# Patient Record
Sex: MALE | Race: AMERICAN INDIAN OR ALASKA NATIVE
[De-identification: names, ages, dates, MRNs, and addresses within clinical notes are randomized per-mention and may not be internally consistent; named-entity substitution may affect disease eponyms.]

---

## 2018-09-13 ENCOUNTER — HOSPITAL ENCOUNTER (EMERGENCY)
Dept: HOSPITAL 5 - ED | Age: 28
LOS: 1 days | Discharge: PSYCHIATRIC OR SUBSTANCE USE FACILITY | End: 2018-09-14
Payer: MEDICAID

## 2018-09-13 DIAGNOSIS — R10.9: Primary | ICD-10-CM

## 2018-09-13 DIAGNOSIS — F20.9: ICD-10-CM

## 2018-09-13 DIAGNOSIS — F31.9: ICD-10-CM

## 2018-09-13 DIAGNOSIS — I10: ICD-10-CM

## 2018-09-13 LAB
ALBUMIN SERPL-MCNC: 4.3 G/DL (ref 3.9–5)
ALT SERPL-CCNC: 36 UNITS/L (ref 7–56)
BACTERIA #/AREA URNS HPF: (no result) /HPF
BASOPHILS # (AUTO): 0 K/MM3 (ref 0–0.1)
BASOPHILS NFR BLD AUTO: 0.4 % (ref 0–1.8)
BENZODIAZEPINES SCREEN,URINE: (no result)
BILIRUB UR QL STRIP: (no result)
BLOOD UR QL VISUAL: (no result)
BUN SERPL-MCNC: 14 MG/DL (ref 9–20)
BUN/CREAT SERPL: 12 %
CALCIUM SERPL-MCNC: 9.6 MG/DL (ref 8.4–10.2)
EOSINOPHIL # BLD AUTO: 0.1 K/MM3 (ref 0–0.4)
EOSINOPHIL NFR BLD AUTO: 0.8 % (ref 0–4.3)
HCT VFR BLD CALC: 48.3 % (ref 35.5–45.6)
HEMOLYSIS INDEX: 7
HGB BLD-MCNC: 16.1 GM/DL (ref 11.8–15.2)
LIPASE SERPL-CCNC: 37 UNITS/L (ref 13–60)
LYMPHOCYTES # BLD AUTO: 3 K/MM3 (ref 1.2–5.4)
LYMPHOCYTES NFR BLD AUTO: 24.9 % (ref 13.4–35)
MCH RBC QN AUTO: 29 PG (ref 28–32)
MCHC RBC AUTO-ENTMCNC: 33 % (ref 32–34)
MCV RBC AUTO: 87 FL (ref 84–94)
METHADONE SCREEN,URINE: (no result)
MONOCYTES # (AUTO): 0.8 K/MM3 (ref 0–0.8)
MONOCYTES % (AUTO): 6.8 % (ref 0–7.3)
MUCOUS THREADS #/AREA URNS HPF: (no result) /HPF
OPIATE SCREEN,URINE: (no result)
PH UR STRIP: 6 [PH] (ref 5–7)
PLATELET # BLD: 262 K/MM3 (ref 140–440)
PROT UR STRIP-MCNC: (no result) MG/DL
RBC # BLD AUTO: 5.57 M/MM3 (ref 3.65–5.03)
RBC #/AREA URNS HPF: 2 /HPF (ref 0–6)
UROBILINOGEN UR-MCNC: 2 MG/DL (ref ?–2)
WBC #/AREA URNS HPF: 1 /HPF (ref 0–6)

## 2018-09-13 PROCEDURE — 99285 EMERGENCY DEPT VISIT HI MDM: CPT

## 2018-09-13 PROCEDURE — 36415 COLL VENOUS BLD VENIPUNCTURE: CPT

## 2018-09-13 PROCEDURE — 93005 ELECTROCARDIOGRAM TRACING: CPT

## 2018-09-13 PROCEDURE — 80307 DRUG TEST PRSMV CHEM ANLYZR: CPT

## 2018-09-13 PROCEDURE — 80320 DRUG SCREEN QUANTALCOHOLS: CPT

## 2018-09-13 PROCEDURE — 81001 URINALYSIS AUTO W/SCOPE: CPT

## 2018-09-13 PROCEDURE — 93010 ELECTROCARDIOGRAM REPORT: CPT

## 2018-09-13 PROCEDURE — G0480 DRUG TEST DEF 1-7 CLASSES: HCPCS

## 2018-09-13 PROCEDURE — 85025 COMPLETE CBC W/AUTO DIFF WBC: CPT

## 2018-09-13 PROCEDURE — C9113 INJ PANTOPRAZOLE SODIUM, VIA: HCPCS

## 2018-09-13 PROCEDURE — 83690 ASSAY OF LIPASE: CPT

## 2018-09-13 PROCEDURE — 80053 COMPREHEN METABOLIC PANEL: CPT

## 2018-09-13 PROCEDURE — 96374 THER/PROPH/DIAG INJ IV PUSH: CPT

## 2018-09-13 PROCEDURE — 74022 RADEX COMPL AQT ABD SERIES: CPT

## 2018-09-13 NOTE — EMERGENCY DEPARTMENT REPORT
ED Abdominal Pain HPI





- General


Chief Complaint: Abdominal Pain


Stated Complaint: CHEST PAIN  STOMACH PAIN RT SIDE PAIN


Time Seen by Provider: 18 06:13


Source: patient


Mode of arrival: Ambulatory


Limitations: No Limitations





- History of Present Illness


Initial Comments: 





Patient is 27 years old male recently moved from [place].  Patient stated that 

he had history of hypertension, gastric ulcer, schizophrenia, bipolar and 

depression.  Patient presented to the ER initially complaining of abdominal 

pain for 2 weeks.  Patient stated that he went to [facility] last week and 

they did blood work and CT abdomen and pelvis and was told that everything is 

fine.  Patient also had a CT abdomen and pelvis done here in this hospital on  with no acute finding.  Patient stated that his pain still there.  

Patient describes his pain as diffuse abdominal cramping associated with nausea 

but no vomiting.  Patient denied any fever.  As I'm explaining to the patient 

his lab results and the need to follow-up with a GI doctor as an outpatient for 

possible endoscopy and colonoscopy, patient stated that he is not safe to go 

home because he is thinking about killing himself or killing someone else.  The 

patient denied any auditory or visual hallucination.  Patient immediately 

brought on 1013 and mental health evaluation is requested.


MD Complaint: abdominal pain


Severity scale (0 -10): 10





- Related Data


 Previous Rx's











 Medication  Instructions  Recorded  Last Taken  Type


 


Acetaminophen [Acetaminophen TAB] 650 mg PO Q4H PRN  tablet 07/15/18 Unknown Rx


 


Ondansetron [Zofran TAB] 4 mg PO Q8HR PRN #30 tablet 07/15/18 Unknown Rx


 


clonazePAM 0.5 mg PO BID #60 tab.rapdis 07/15/18 Unknown Rx











 Allergies











Allergy/AdvReac Type Severity Reaction Status Date / Time


 


No Known Allergies Allergy   Unverified 18 13:31














ED Review of Systems


ROS: 


Stated complaint: CHEST PAIN  STOMACH PAIN RT SIDE PAIN


Other details as noted in HPI





Comment: All other systems reviewed and negative


Constitutional: denies: chills, fever


Respiratory: denies: cough, orthopnea, shortness of breath, SOB with exertion


Cardiovascular: denies: chest pain, palpitations, dyspnea on exertion


Gastrointestinal: nausea.  denies: abdominal pain, vomiting, diarrhea, 

constipation, hematemesis, melena, hematochezia


Neurological: denies: headache, weakness, numbness, paresthesias, confusion, 

abnormal gait





ED Past Medical Hx





- Past Medical History


Previous Medical History?: Yes


Hx Hypertension: Yes


Hx Heart Attack/AMI: No


Hx Congestive Heart Failure: No


Hx Diabetes: No


Hx Deep Vein Thrombosis: No


Hx Asthma: No


Hx COPD: No





- Surgical History


Past Surgical History?: Yes


Hx Coronary Stent: No


Hx Pacemaker: No


Hx Internal Defibrillator: No





- Social History


Smoking Status: Never Smoker


Substance Use Type: Alcohol





- Medications


Home Medications: 


 Home Medications











 Medication  Instructions  Recorded  Confirmed  Last Taken  Type


 


Acetaminophen [Acetaminophen TAB] 650 mg PO Q4H PRN  tablet 07/15/18  Unknown Rx


 


Ondansetron [Zofran TAB] 4 mg PO Q8HR PRN #30 tablet 07/15/18  Unknown Rx


 


clonazePAM 0.5 mg PO BID #60 tab.rapdis 07/15/18  Unknown Rx














ED Physical Exam





- General


Limitations: No Limitations


General appearance: alert, in no apparent distress





- Head


Head exam: Present: atraumatic, normocephalic, normal inspection





- ENT


ENT exam: Present: normal exam, normal orophraynx, mucous membranes moist





- Neck


Neck exam: Present: normal inspection, full ROM.  Absent: tenderness, 

meningismus, lymphadenopathy, thyromegaly





- Respiratory


Respiratory exam: Present: normal lung sounds bilaterally





- Cardiovascular


Cardiovascular Exam: Present: regular rate, normal rhythm, normal heart sounds





- GI/Abdominal


GI/Abdominal exam: Present: soft, normal bowel sounds.  Absent: distended, 

tenderness, guarding, rebound, rigid, organomegaly, mass, bruit, pulsatile mass

, hernia





- Extremities Exam


Extremities exam: Present: normal inspection, full ROM, normal capillary 

refill.  Absent: pedal edema, joint swelling, calf tenderness





- Back Exam


Back exam: Present: normal inspection, full ROM.  Absent: tenderness, CVA 

tenderness (R), CVA tenderness (L), muscle spasm, paraspinal tenderness, 

vertebral tenderness





- Neurological Exam


Neurological exam: Present: alert, oriented X3, CN II-XII intact, normal gait, 

reflexes normal





- Psychiatric


Psychiatric exam: Present: depressed, anxious, homicidal ideation, suicidal 

ideation





- Skin


Skin exam: Present: warm, intact, normal color





ED Course


 Vital Signs











  18





  04:34 09:09 10:39


 


Temperature 99.1 F  97.5 F L


 


Pulse Rate 120 H  67


 


Respiratory 18 18 18





Rate   


 


Blood Pressure 162/94  


 


Blood Pressure 162/94  107/61





[Left]   


 


O2 Sat by Pulse 100 97 97





Oximetry   














ED Medical Decision Making





- Lab Data


Result diagrams: 


 18 04:46





 18 04:46





- EKG Data


-: EKG Interpreted by Me


EKG shows normal: sinus rhythm


Rate: normal





- EKG Data


Interpretation: no acute changes





- Radiology Data


Radiology results: report reviewed





Referring Physician:   SARA MEDLEY


Patient Name:   PAPITO SAMUELS


Patient ID:   [id number]


YOB: 1990


Sex:   Male


Accession:   [id number]


Report Date:   2018


Report Status:   Finalized


Findings


[facility] 


[address] [place] [address] 





XRay Report 


Signed 





Patient: PAPITO SAMUELS JR MR#: [id number] 


: 1990 Acct:[id number] 


Age/Sex: 27 / M ADM Date: 18 


Loc: ED 


Attending Dr: 








Ordering Physician: SARA MEDLEY 


Date of Service: 18 


Procedure(s): XR abd series w cxr 1V 


Accession Number(s): [id number] 





cc: SARA MEDLEY 





Fluoro Time In Minutes: 





FINAL REPORT 





EXAM: XR ABD SERIES W CXR 1V 





HISTORY: abdominal pain 





TECHNIQUE: A PA view of the chest was obtained along with three 


views of the abdomen and pelvis. 





FINDINGS: 


The chest reveals normal heart size and mediastinum. The lungs 


are clear. Pleural fluid is not seen. The bones and soft tissues 


are well maintained. 





The abdominal bowel gas pattern is normal. There is no evidence 


of mass effect or suspicious calcifications. The skeletal 


structures reveal spurring along the right iliac wing. 





IMPRESSION: 


No acute process in the chest. 





No acute process in the abdomen and pelvis. 











Transcribed By: RB 


Dictated By: DADA POWERS MD 


Electronically Authenticated By: DADA POWERS MD 


Signed Date/Time: 18 











DD/DT: 18 


TD/TT: 18


Critical care attestation.: 


If time is entered above; I have spent that time in minutes in the direct care 

of this critically ill patient, excluding procedure time.








ED Disposition


Clinical Impression: 


 Abdominal pain, Suicidal ideation, Homicidal ideation





Disposition: DC/TX-65 PSY HOSP/PSY UNIT


Is pt being admited?: No


Condition: Stable


Referrals: 


PRIMARY CARE,MD [Primary Care Provider] - 3-5 Days

## 2018-09-13 NOTE — XRAY REPORT
FINAL REPORT



EXAM:  XR ABD SERIES W CXR 1V



HISTORY:  abdominal pain 



TECHNIQUE:  A PA view of the chest was obtained along with three

views of the abdomen and pelvis.



FINDINGS:  

The chest reveals normal heart size and mediastinum. The lungs

are clear. Pleural fluid is not seen. The bones and soft tissues

are well maintained.



The abdominal bowel gas pattern is normal. There is no evidence

of mass effect or suspicious calcifications. The skeletal

structures reveal spurring along the right iliac wing.



IMPRESSION:  

No acute process in the chest.



No acute process in the abdomen and pelvis.

## 2018-09-14 VITALS — DIASTOLIC BLOOD PRESSURE: 53 MMHG | SYSTOLIC BLOOD PRESSURE: 100 MMHG

## 2018-10-07 ENCOUNTER — HOSPITAL ENCOUNTER (EMERGENCY)
Dept: HOSPITAL 5 - ED | Age: 28
LOS: 1 days | Discharge: HOME | End: 2018-10-08
Payer: MEDICAID

## 2018-10-07 VITALS — SYSTOLIC BLOOD PRESSURE: 106 MMHG | DIASTOLIC BLOOD PRESSURE: 67 MMHG

## 2018-10-07 DIAGNOSIS — I10: ICD-10-CM

## 2018-10-07 DIAGNOSIS — R10.9: Primary | ICD-10-CM

## 2018-10-07 DIAGNOSIS — M79.671: ICD-10-CM

## 2018-10-07 DIAGNOSIS — K21.9: ICD-10-CM

## 2018-10-07 LAB
BUN SERPL-MCNC: 11 MG/DL (ref 9–20)
BUN/CREAT SERPL: 9 %
CALCIUM SERPL-MCNC: 9.6 MG/DL (ref 8.4–10.2)
HCT VFR BLD CALC: 47.8 % (ref 35.5–45.6)
HEMOLYSIS INDEX: 6
HGB BLD-MCNC: 15.7 GM/DL (ref 11.8–15.2)
MCH RBC QN AUTO: 29 PG (ref 28–32)
MCHC RBC AUTO-ENTMCNC: 33 % (ref 32–34)
MCV RBC AUTO: 88 FL (ref 84–94)
PLATELET # BLD: 255 K/MM3 (ref 140–440)
RBC # BLD AUTO: 5.45 M/MM3 (ref 3.65–5.03)

## 2018-10-07 PROCEDURE — 83690 ASSAY OF LIPASE: CPT

## 2018-10-07 PROCEDURE — 36415 COLL VENOUS BLD VENIPUNCTURE: CPT

## 2018-10-07 PROCEDURE — 80074 ACUTE HEPATITIS PANEL: CPT

## 2018-10-07 PROCEDURE — 74019 RADEX ABDOMEN 2 VIEWS: CPT

## 2018-10-07 PROCEDURE — 80048 BASIC METABOLIC PNL TOTAL CA: CPT

## 2018-10-07 PROCEDURE — 99284 EMERGENCY DEPT VISIT MOD MDM: CPT

## 2018-10-07 PROCEDURE — 85027 COMPLETE CBC AUTOMATED: CPT

## 2018-10-07 NOTE — XRAY REPORT
FINAL REPORT



EXAM:  XR ABDOMEN 2V



HISTORY:  Abd pain 



TECHNIQUE:  Frontal view of the chest and frontal views of the

abdomen and pelvis in the supine and upright positions 



Comparison: Abdominal series dated September 13, 2018 and CT

abdomen and pelvis dated July 14, 2018 



FINDINGS:  

The entirety of the lung apices are not imaged.



There is bilateral hypoinflation. 



There is no evidence of focal infiltrate, pneumothorax or pleural

fluid collection. 



The cardiac silhouette appears to be enlarged. This may be

exaggerated by portable technique. 



The bowel gas pattern is nonobstructive with air in mildly

distended loops of small bowel and colon. 



There is no evidence of pneumoperitoneum. 



IMPRESSION:  

1. Pulmonary hypoinflation without evidence of an acute pulmonary

process. 



2. Nonobstructive bowel gas pattern. 



If there is a clinical suspicion of an acute intra-abdominal

process, CT abdomen and pelvis may be helpful for further

evaluation.

## 2018-10-07 NOTE — XRAY REPORT
FINAL REPORT



EXAM:  XR FOOT 3+V RT



HISTORY:  PAIN/SWELLING RT ACCIDENT/SWELLING OF TOES 



TECHNIQUE:  Frontal, lateral, oblique views right foot



Comparison: None 



FINDINGS:  

There is no evidence of fracture or subluxation.



The joint spaces appear to be maintained.



There is soft tissue swelling of the forefoot.



IMPRESSION:  

1. Soft tissue swelling without plain film evidence of fracture

or subluxation.



If further imaging is required, MRI may be helpful.

## 2018-10-08 LAB
ALBUMIN SERPL-MCNC: 4.6 G/DL (ref 3.9–5)
ALT SERPL-CCNC: 30 UNITS/L (ref 7–56)
BILIRUB DIRECT SERPL-MCNC: < 0.2 MG/DL (ref 0–0.2)
LIPASE SERPL-CCNC: 25 UNITS/L (ref 13–60)
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
## 2019-01-27 ENCOUNTER — HOSPITAL ENCOUNTER (EMERGENCY)
Dept: HOSPITAL 5 - ED | Age: 29
Discharge: HOME | End: 2019-01-27
Payer: COMMERCIAL

## 2019-01-27 VITALS — DIASTOLIC BLOOD PRESSURE: 87 MMHG | SYSTOLIC BLOOD PRESSURE: 146 MMHG

## 2019-01-27 DIAGNOSIS — M10.9: ICD-10-CM

## 2019-01-27 DIAGNOSIS — F41.9: ICD-10-CM

## 2019-01-27 DIAGNOSIS — R07.89: ICD-10-CM

## 2019-01-27 DIAGNOSIS — I10: ICD-10-CM

## 2019-01-27 DIAGNOSIS — K21.9: Primary | ICD-10-CM

## 2019-01-27 DIAGNOSIS — R20.2: ICD-10-CM

## 2019-01-27 DIAGNOSIS — F32.9: ICD-10-CM

## 2019-01-27 DIAGNOSIS — G90.50: ICD-10-CM

## 2019-01-27 PROCEDURE — 99282 EMERGENCY DEPT VISIT SF MDM: CPT

## 2019-01-27 PROCEDURE — 96372 THER/PROPH/DIAG INJ SC/IM: CPT
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
## 2019-04-10 ENCOUNTER — HOSPITAL ENCOUNTER (EMERGENCY)
Dept: HOSPITAL 5 - ED | Age: 29
Discharge: HOME | End: 2019-04-10
Payer: SELF-PAY

## 2019-04-10 VITALS — DIASTOLIC BLOOD PRESSURE: 70 MMHG | SYSTOLIC BLOOD PRESSURE: 110 MMHG

## 2019-04-10 DIAGNOSIS — R07.89: Primary | ICD-10-CM

## 2019-04-10 DIAGNOSIS — K21.9: ICD-10-CM

## 2019-04-10 DIAGNOSIS — R10.9: ICD-10-CM

## 2019-04-10 DIAGNOSIS — I10: ICD-10-CM

## 2019-04-10 LAB
ALBUMIN SERPL-MCNC: 3.8 G/DL (ref 3.9–5)
ALT SERPL-CCNC: 22 UNITS/L (ref 7–56)
BASOPHILS # (AUTO): 0.1 K/MM3 (ref 0–0.1)
BASOPHILS NFR BLD AUTO: 0.6 % (ref 0–1.8)
BILIRUB DIRECT SERPL-MCNC: < 0.2 MG/DL (ref 0–0.2)
BILIRUB UR QL STRIP: (no result)
BLOOD UR QL VISUAL: (no result)
BUN SERPL-MCNC: 13 MG/DL (ref 9–20)
BUN/CREAT SERPL: 11 %
CALCIUM SERPL-MCNC: 9.1 MG/DL (ref 8.4–10.2)
EOSINOPHIL # BLD AUTO: 0.2 K/MM3 (ref 0–0.4)
EOSINOPHIL NFR BLD AUTO: 2.2 % (ref 0–4.3)
HCT VFR BLD CALC: 49 % (ref 35.5–45.6)
HEMOLYSIS INDEX: 30
HGB BLD-MCNC: 16.2 GM/DL (ref 11.8–15.2)
LYMPHOCYTES # BLD AUTO: 3 K/MM3 (ref 1.2–5.4)
LYMPHOCYTES NFR BLD AUTO: 32.2 % (ref 13.4–35)
MCHC RBC AUTO-ENTMCNC: 33 % (ref 32–34)
MCV RBC AUTO: 89 FL (ref 84–94)
MONOCYTES # (AUTO): 0.5 K/MM3 (ref 0–0.8)
MONOCYTES % (AUTO): 5.8 % (ref 0–7.3)
PH UR STRIP: 6 [PH] (ref 5–7)
PLATELET # BLD: 248 K/MM3 (ref 140–440)
PROT UR STRIP-MCNC: (no result) MG/DL
RBC # BLD AUTO: 5.52 M/MM3 (ref 3.65–5.03)
RBC #/AREA URNS HPF: < 1 /HPF (ref 0–6)
UROBILINOGEN UR-MCNC: < 2 MG/DL (ref ?–2)
WBC #/AREA URNS HPF: < 1 /HPF (ref 0–6)

## 2019-04-10 PROCEDURE — 99285 EMERGENCY DEPT VISIT HI MDM: CPT

## 2019-04-10 PROCEDURE — 93005 ELECTROCARDIOGRAM TRACING: CPT

## 2019-04-10 PROCEDURE — 80076 HEPATIC FUNCTION PANEL: CPT

## 2019-04-10 PROCEDURE — 85025 COMPLETE CBC W/AUTO DIFF WBC: CPT

## 2019-04-10 PROCEDURE — 71046 X-RAY EXAM CHEST 2 VIEWS: CPT

## 2019-04-10 PROCEDURE — 81001 URINALYSIS AUTO W/SCOPE: CPT

## 2019-04-10 PROCEDURE — 74177 CT ABD & PELVIS W/CONTRAST: CPT

## 2019-04-10 PROCEDURE — 82962 GLUCOSE BLOOD TEST: CPT

## 2019-04-10 PROCEDURE — 84484 ASSAY OF TROPONIN QUANT: CPT

## 2019-04-10 PROCEDURE — 83690 ASSAY OF LIPASE: CPT

## 2019-04-10 PROCEDURE — 36415 COLL VENOUS BLD VENIPUNCTURE: CPT

## 2019-04-10 PROCEDURE — 85379 FIBRIN DEGRADATION QUANT: CPT

## 2019-04-10 PROCEDURE — 93010 ELECTROCARDIOGRAM REPORT: CPT

## 2019-04-10 PROCEDURE — 80048 BASIC METABOLIC PNL TOTAL CA: CPT
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
## 2019-04-10 NOTE — CAT SCAN REPORT
CT ABDOMEN PELVIS WITH CONTRAST:



HISTORY:  Left abdominal and flank pain, weight loss.



COMPARISON: Noncontrast CT abdomen and pelvis dated 7/14/18.



TECHNIQUE:  Helical CT in 1.25mm intervals following IV contrast. 

Sagittal and coronal reconstructions. 





FINDINGS:



Lung bases: Normal.



Liver: Normal.



Biliary system: Normal.



Pancreas: Normal.



Spleen: Normal.



Kidneys/ureters/bladder: Normal.



Adrenal glands: Normal.



Aorta: Normal.



Intestines: Normal.



Appendix: Normal.



Pelvic viscera: Normal.



Ascites: None.



Adenopathy: None.



Musculoskeletal: Normal.





IMPRESSION:

Unremarkable CT scan of the abdomen and pelvis with contrast.

## 2019-04-10 NOTE — XRAY REPORT
PROCEDURE: XR CHEST ROUTINE 2V 

 

TECHNIQUE:  PA and lateral views of the chest were submitted. 

 

HISTORY: Chest Pain 

 

COMPARISONS: 7/13/2018  

 

FINDINGS: 

 

The heart size and mediastinum appear normal. The lungs are not congested. Pleural fluid is not seen.
 The bones and soft tissues appear normal. 

 

IMPRESSION:  

 

No acute cardiopulmonary process.. 

 

This document is electronically signed by Mahin Samuel MD., April 10 2019 04:51:25 AM ET